# Patient Record
Sex: MALE | Race: BLACK OR AFRICAN AMERICAN | Employment: UNEMPLOYED | URBAN - METROPOLITAN AREA
[De-identification: names, ages, dates, MRNs, and addresses within clinical notes are randomized per-mention and may not be internally consistent; named-entity substitution may affect disease eponyms.]

---

## 2018-03-01 ENCOUNTER — HOSPITAL ENCOUNTER (EMERGENCY)
Facility: HOSPITAL | Age: 33
Discharge: HOME | End: 2018-03-01
Payer: MEDICAID

## 2018-03-01 ENCOUNTER — APPOINTMENT (OUTPATIENT)
Dept: GENERAL RADIOLOGY | Facility: HOSPITAL | Age: 33
End: 2018-03-01
Payer: MEDICAID

## 2018-03-01 VITALS
DIASTOLIC BLOOD PRESSURE: 87 MMHG | WEIGHT: 199 LBS | HEART RATE: 85 BPM | SYSTOLIC BLOOD PRESSURE: 120 MMHG | BODY MASS INDEX: 30.16 KG/M2 | HEIGHT: 68 IN | TEMPERATURE: 98 F | RESPIRATION RATE: 18 BRPM | OXYGEN SATURATION: 98 %

## 2018-03-01 DIAGNOSIS — S86.811A RUPTURE OF RIGHT PATELLAR TENDON, INITIAL ENCOUNTER: Primary | ICD-10-CM

## 2018-03-01 PROCEDURE — 73560 X-RAY EXAM OF KNEE 1 OR 2: CPT

## 2018-03-01 PROCEDURE — 99283 EMERGENCY DEPT VISIT LOW MDM: CPT

## 2018-03-01 RX ORDER — HYDROCODONE BITARTRATE AND ACETAMINOPHEN 5; 325 MG/1; MG/1
1-2 TABLET ORAL EVERY 4 HOURS PRN
Qty: 14 TABLET | Refills: 0 | Status: SHIPPED | OUTPATIENT
Start: 2018-03-01 | End: 2018-03-08

## 2018-03-01 NOTE — ED PROVIDER NOTES
Patient Seen in: [facility] AND [facility] Emergency Department    History   Patient presents with:  Lower Extremity Injury (musculoskeletal)    Stated Complaint: right knee injury    HPI    68-year-old male presents to the emergency department complaining of ri and oriented to person, place, and time. No cranial nerve deficit. Skin: Skin is warm and dry. Nursing note and vitals reviewed.             ED Course   Labs Reviewed - No data to display    ED Course as of Mar 01 1315  ---------------------------------

## 2018-03-01 NOTE — ED NOTES
Patient received discharge instructions with follow-up instructions/medications and verbalized understanding. Patient discharged out of ER in stable condition in no apparent distress.

## 2018-03-05 ENCOUNTER — HOSPITAL ENCOUNTER (OUTPATIENT)
Dept: MRI IMAGING | Facility: HOSPITAL | Age: 33
Discharge: HOME | End: 2018-03-05
Attending: ORTHOPAEDIC SURGERY
Payer: MEDICAID

## 2018-03-05 DIAGNOSIS — M76.51 PATELLAR TENDINITIS OF RIGHT KNEE: ICD-10-CM

## 2018-03-05 PROCEDURE — 73721 MRI JNT OF LWR EXTRE W/O DYE: CPT | Performed by: ORTHOPAEDIC SURGERY

## 2019-01-01 ENCOUNTER — APPOINTMENT (OUTPATIENT)
Dept: CT IMAGING | Facility: HOSPITAL | Age: 34
End: 2019-01-01
Attending: EMERGENCY MEDICINE
Payer: MEDICAID

## 2019-01-01 ENCOUNTER — HOSPITAL ENCOUNTER (EMERGENCY)
Facility: HOSPITAL | Age: 34
End: 2019-01-01
Attending: EMERGENCY MEDICINE
Payer: MEDICAID

## 2019-01-01 ENCOUNTER — APPOINTMENT (OUTPATIENT)
Dept: GENERAL RADIOLOGY | Facility: HOSPITAL | Age: 34
End: 2019-01-01
Attending: EMERGENCY MEDICINE
Payer: MEDICAID

## 2019-01-01 VITALS — WEIGHT: 176.38 LBS | TEMPERATURE: 98 F

## 2019-01-01 DIAGNOSIS — E87.2 LACTIC ACIDOSIS: ICD-10-CM

## 2019-01-01 DIAGNOSIS — J96.01 ACUTE HYPOXEMIC RESPIRATORY FAILURE (HCC): ICD-10-CM

## 2019-01-01 DIAGNOSIS — R41.82 ALTERED MENTAL STATUS, UNSPECIFIED ALTERED MENTAL STATUS TYPE: ICD-10-CM

## 2019-01-01 DIAGNOSIS — I46.9 CARDIAC ARREST (HCC): Primary | ICD-10-CM

## 2019-01-01 DIAGNOSIS — F10.920 ALCOHOLIC INTOXICATION WITHOUT COMPLICATION (HCC): ICD-10-CM

## 2019-01-01 DIAGNOSIS — F19.10 POLYSUBSTANCE ABUSE (HCC): ICD-10-CM

## 2019-01-01 DIAGNOSIS — D72.829 LEUKOCYTOSIS, UNSPECIFIED TYPE: ICD-10-CM

## 2019-01-01 LAB
AMPHET UR QL SCN: NEGATIVE
ANION GAP SERPL CALC-SCNC: 15 MMOL/L (ref 0–18)
BARBITURATES UR QL SCN: NEGATIVE
BASE EXCESS BLD CALC-SCNC: -11.1 MMOL/L (ref ?–2)
BENZODIAZ UR QL SCN: NEGATIVE
BUN BLD-MCNC: 13 MG/DL (ref 7–18)
BUN/CREAT SERPL: 7.6 (ref 10–20)
CALCIUM BLD-MCNC: 8.7 MG/DL (ref 8.5–10.1)
CANNABINOIDS UR QL SCN: NEGATIVE
CHLORIDE SERPL-SCNC: 109 MMOL/L (ref 98–112)
CO2 SERPL-SCNC: 21 MMOL/L (ref 21–32)
CREAT BLD-MCNC: 1.72 MG/DL (ref 0.7–1.3)
ETHANOL SERPL-MCNC: 151 MG/DL (ref ?–3)
GLUCOSE BLD-MCNC: 217 MG/DL (ref 70–99)
GLUCOSE BLDC GLUCOMTR-MCNC: 179 MG/DL (ref 70–99)
HCO3 BLDA-SCNC: 15.5 MEQ/L (ref 21–27)
LACTIC ACID (BLOOD GAS): 7.1 MMOL/L (ref 0.5–2.2)
MDMA UR QL SCN: NEGATIVE
METHADONE UR QL SCN: NEGATIVE
MODIFIED ALLEN TEST: POSITIVE
NT-PROBNP SERPL-MCNC: 26 PG/ML (ref ?–125)
O2 CT BLD-SCNC: 16.7 VOL% (ref 15–23)
O2/TOTAL GAS SETTING VFR VENT: 100 %
OPIATES UR QL SCN: NEGATIVE
OSMOLALITY SERPL CALC.SUM OF ELEC: 307 MOSM/KG (ref 275–295)
OXYCODONE UR QL SCN: NEGATIVE
PCO2 BLDA: 50 MM HG (ref 35–45)
PEEP SETTING VENT: 10 CM H2O
PH BLDA: 7.16 [PH] (ref 7.35–7.45)
PO2 BLDA: 49 MM HG (ref 80–100)
POTASSIUM SERPL-SCNC: 4.1 MMOL/L (ref 3.5–5.1)
PUNCTURE CHARGE: YES
RESP RATE: 15 BPM
SAO2 % BLDA: 78.6 % (ref 94–100)
SODIUM SERPL-SCNC: 145 MMOL/L (ref 136–145)
SPECIMEN VOL 24H UR: 550 ML
TROPONIN I SERPL-MCNC: <0.045 NG/ML (ref ?–0.04)

## 2019-01-01 PROCEDURE — 80307 DRUG TEST PRSMV CHEM ANLYZR: CPT | Performed by: EMERGENCY MEDICINE

## 2019-01-01 PROCEDURE — 96366 THER/PROPH/DIAG IV INF ADDON: CPT

## 2019-01-01 PROCEDURE — 82805 BLOOD GASES W/O2 SATURATION: CPT | Performed by: EMERGENCY MEDICINE

## 2019-01-01 PROCEDURE — 96365 THER/PROPH/DIAG IV INF INIT: CPT

## 2019-01-01 PROCEDURE — 92950 HEART/LUNG RESUSCITATION CPR: CPT

## 2019-01-01 PROCEDURE — 85060 BLOOD SMEAR INTERPRETATION: CPT | Performed by: EMERGENCY MEDICINE

## 2019-01-01 PROCEDURE — 84484 ASSAY OF TROPONIN QUANT: CPT | Performed by: EMERGENCY MEDICINE

## 2019-01-01 PROCEDURE — 85027 COMPLETE CBC AUTOMATED: CPT | Performed by: EMERGENCY MEDICINE

## 2019-01-01 PROCEDURE — 83605 ASSAY OF LACTIC ACID: CPT | Performed by: EMERGENCY MEDICINE

## 2019-01-01 PROCEDURE — 85007 BL SMEAR W/DIFF WBC COUNT: CPT | Performed by: EMERGENCY MEDICINE

## 2019-01-01 PROCEDURE — 85025 COMPLETE CBC W/AUTO DIFF WBC: CPT | Performed by: EMERGENCY MEDICINE

## 2019-01-01 PROCEDURE — 80048 BASIC METABOLIC PNL TOTAL CA: CPT | Performed by: EMERGENCY MEDICINE

## 2019-01-01 PROCEDURE — 99291 CRITICAL CARE FIRST HOUR: CPT

## 2019-01-01 PROCEDURE — 93005 ELECTROCARDIOGRAM TRACING: CPT

## 2019-01-01 PROCEDURE — 82962 GLUCOSE BLOOD TEST: CPT

## 2019-01-01 PROCEDURE — 80320 DRUG SCREEN QUANTALCOHOLS: CPT | Performed by: EMERGENCY MEDICINE

## 2019-01-01 PROCEDURE — 36600 WITHDRAWAL OF ARTERIAL BLOOD: CPT | Performed by: EMERGENCY MEDICINE

## 2019-01-01 PROCEDURE — 71045 X-RAY EXAM CHEST 1 VIEW: CPT | Performed by: EMERGENCY MEDICINE

## 2019-01-01 PROCEDURE — 83880 ASSAY OF NATRIURETIC PEPTIDE: CPT | Performed by: EMERGENCY MEDICINE

## 2019-01-01 PROCEDURE — 93010 ELECTROCARDIOGRAM REPORT: CPT | Performed by: EMERGENCY MEDICINE

## 2019-01-01 PROCEDURE — 94002 VENT MGMT INPAT INIT DAY: CPT

## 2019-01-01 PROCEDURE — 99292 CRITICAL CARE ADDL 30 MIN: CPT

## 2019-01-01 PROCEDURE — 96375 TX/PRO/DX INJ NEW DRUG ADDON: CPT

## 2019-01-01 RX ORDER — SODIUM CHLORIDE 0.9 % (FLUSH) 0.9 %
3 SYRINGE (ML) INJECTION AS NEEDED
Status: CANCELLED | OUTPATIENT
Start: 2019-01-01

## 2019-01-01 RX ORDER — SODIUM CHLORIDE 9 MG/ML
INJECTION, SOLUTION INTRAVENOUS CONTINUOUS
Status: CANCELLED | OUTPATIENT
Start: 2019-01-01

## 2019-01-01 RX ORDER — MIDAZOLAM HYDROCHLORIDE 5 MG/ML
5 INJECTION INTRAMUSCULAR; INTRAVENOUS ONCE
Status: COMPLETED | OUTPATIENT
Start: 2019-01-01 | End: 2019-01-01

## 2019-01-01 RX ORDER — MIDAZOLAM HYDROCHLORIDE 5 MG/ML
INJECTION INTRAMUSCULAR; INTRAVENOUS
Status: COMPLETED
Start: 2019-01-01 | End: 2019-01-01

## 2019-05-24 NOTE — ED PROVIDER NOTES
Patient Seen in: [facility] AND [facility] Emergency Department    History   Patient presents with:  Alcohol Intoxication (neurologic)    Stated Complaint: ETOH    HPI    29 yo male was found sleeping in his car and brought to the ED.  There was no report of an a tenderness. Lymphadenopathy:     He has no cervical adenopathy. Neurological: He is alert and oriented to person, place, and time. No cranial nerve deficit. Skin: Skin is warm and dry. Psychiatric: He has a normal mood and affect.  His behavior is n diagnosis)    Disposition:  Discharge  5/24/2019  5:46 am    Follow-up:  No follow-up provider specified. Medications Prescribed:  There are no discharge medications for this patient.

## 2019-05-24 NOTE — CM/SW NOTE
Spoke with patient to arrange transporation home when discharged from ED. Confirmed address and explained that [facility] would be arranging a taxi, through his [address], to take him home. LYFT to arrive in 20mins.   Lizette Bell, , barbie Carey
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
## 2019-08-11 PROBLEM — R41.82 ALTERED MENTAL STATUS: Status: ACTIVE | Noted: 2019-01-01

## 2019-08-11 NOTE — ED PROVIDER NOTES
Patient Seen in: [facility] AND [facility] Emergency Department    History   Patient presents with:  Altered Mental Status (neurologic)    Stated Complaint:     HPI    History is provided by EMS.     56-year-old male with unknown past medical history brought in b subscore is 2. GCS motor subscore is 4. Skin: Skin is warm. Nursing note and vitals reviewed. ED Course       EKG    Rate, intervals and axes as noted on EKG Report.   Rate: 162  Rhythm: Sinus Rhythm  Reading: abnormal for rate, normal for rhyt Potassium 4.1 3.5 - 5.1 mmol/L    Chloride 109 98 - 112 mmol/L    CO2 21.0 21.0 - 32.0 mmol/L    Anion Gap 15 0 - 18 mmol/L    BUN 13 7 - 18 mg/dL    Creatinine 1.72 (H) 0.70 - 1.30 mg/dL    BUN/CREA Ratio 7.6 (L) 10.0 - 20.0    Calcium, Total 8.7 8.5 - 10 Reviewed by me while in ED:      CXR AP 0040 hours    Comparison: None      IMPRESSION:    - On the most recent image acquired at 12:44 AM, the endotracheal tube tip is 4 cm above the oleg  - Diffuse interstitial opacities could reflect edema.   No focal death 5  - PD at bedside - family notified to come to ED    Medical Record Review: I personally reviewed available prior medical records for any recent pertinent discharge summaries, testing, and procedures, and reviewed those reports.     Complicating F

## 2019-08-11 NOTE — ED NOTES
Per lab, [facility] will have to come to  blood of pt directly from lab with appropriate paper work. Satya from [facility]. made aware.

## 2019-08-11 NOTE — ED NOTES
Spoke with Myron from [employer]. Advised of pt death. Referral number is [id number]. Myron will call back in 1 hour with further information.

## 2019-08-11 NOTE — ED NOTES
Pt mother has arrived. Pt mother directed to consult room. [place] in consult room with pt mother.

## 2019-08-11 NOTE — ED NOTES
Ofc Brand Sidhu from [facility] PD at pt bedside. Pt is in police custody. Per ofc. [place] pt ID and wallet is at PD.

## 2019-08-11 NOTE — ED NOTES
Per Kelley Guzman from [facility] ok to release pt to [facility] then they will pick pt up today along with pt blood from lab.
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
## 2019-08-11 NOTE — ED NOTES
Pt continues to have blood in ET tube and suctioned out by RT. Pt has a total of 150cc of blood suctioned from ETT.

## 2019-08-11 NOTE — ED NOTES
Spoke with Souleymane Mooney from [facility] per Souleymane Mooney he will call back in 1 hour to see status of family and labs to send to .

## 2019-08-11 NOTE — ED NOTES
Per Dr. Mitzi Mendez pt should receive 4L of fluids. Pt currently has 4th Liter infusing at this time.

## 2019-08-11 NOTE — ED NOTES
[employer] packet placed in folder and placed with pt along with a copy of pt chart. Transport called. Transport aware to call security when picking pt up.

## 2019-08-11 NOTE — ED NOTES
Spoke with [employer]. Per [employer] they will reach out to pt mother. Per [employer] pt can go to more then . Pt is not a [employer] candidate at this time.

## 2019-08-11 NOTE — ED NOTES
Spoke with lab regarding pt blood for [employer]. Lab states they are unsure if they are able to give us blood vials that are still there from pt for . Lab states they will call back after reaching out to their supervisor.

## 2019-08-11 NOTE — ED NOTES
Spoke with [employer] with [facility]. 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 Per [employer], he needs a copy of the run sheet, copy of pt chart, labs, H&P faxed to [phone]. Per [employer] pt will be scheduled for an exam on Monday morning.  [employer] also requesting pt blood f

## 2019-08-12 LAB
BASOPHILS # BLD: 0 X10(3) UL (ref 0–0.2)
BASOPHILS NFR BLD: 0 %
DEPRECATED RDW RBC AUTO: 50.5 FL (ref 35.1–46.3)
EOSINOPHIL # BLD: 0.63 X10(3) UL (ref 0–0.7)
EOSINOPHIL NFR BLD: 2 %
ERYTHROCYTE [DISTWIDTH] IN BLOOD BY AUTOMATED COUNT: 13.5 % (ref 11–15)
HCT VFR BLD AUTO: 53.6 % (ref 39–53)
HGB BLD-MCNC: 17.6 G/DL (ref 13–17.5)
LYMPHOCYTES NFR BLD: 11.58 X10(3) UL (ref 1–4)
LYMPHOCYTES NFR BLD: 35 %
MCH RBC QN AUTO: 33.1 PG (ref 26–34)
MCHC RBC AUTO-ENTMCNC: 32.8 G/DL (ref 31–37)
MCV RBC AUTO: 100.9 FL (ref 80–100)
MONOCYTES # BLD: 1.25 X10(3) UL (ref 0.1–1)
MONOCYTES NFR BLD: 4 %
NEUTROPHILS # BLD AUTO: 17.75 X10 (3) UL (ref 1.5–7.7)
NEUTROPHILS NFR BLD: 54 %
NEUTS BAND NFR BLD: 2 %
NEUTS HYPERSEG # BLD: 17.53 X10(3) UL (ref 1.5–7.7)
PLASMA CELLS # BLD: 0.31 X10(3) UL
PLASMA CELLS NFR BLD: 1 %
PLATELET # BLD AUTO: 336 10(3)UL (ref 150–450)
PLATELET MORPHOLOGY: NORMAL
RBC # BLD AUTO: 5.31 X10(6)UL (ref 4.3–5.7)
TOTAL CELLS COUNTED: 100
VARIANT LYMPHS NFR BLD MANUAL: 2 %
WBC # BLD AUTO: 31.3 X10(3) UL (ref 4–11)

## (undated) NOTE — ED AVS SNAPSHOT
Adrian Mathias   MRN: [id number]    Department:  [facility] Emergency Department   Date of Visit:  5/24/2019           Disclosure     Insurance plans vary and the physician(s) referred by the ER may not be covered by your plan.  Please contact CARE PHYSICIAN AT ONCE OR RETURN IMMEDIATELY TO THE EMERGENCY DEPARTMENT. If you have been prescribed any medication(s), please fill your prescription right away and begin taking the medication(s) as directed.   If you believe that any of the medications

## (undated) NOTE — ED AVS SNAPSHOT
Jonathan Cathleen   MRN: [id number]    Department:  [facility] Emergency Department   Date of Visit:  3/1/2018           Disclosure     Insurance plans vary and the physician(s) referred by the ER may not be covered by your plan.  Please contact y CARE PHYSICIAN AT ONCE OR RETURN IMMEDIATELY TO THE EMERGENCY DEPARTMENT. If you have been prescribed any medication(s), please fill your prescription right away and begin taking the medication(s) as directed.   If you believe that any of the medications